# Patient Record
Sex: FEMALE | Race: BLACK OR AFRICAN AMERICAN | NOT HISPANIC OR LATINO | URBAN - METROPOLITAN AREA
[De-identification: names, ages, dates, MRNs, and addresses within clinical notes are randomized per-mention and may not be internally consistent; named-entity substitution may affect disease eponyms.]

---

## 2020-08-31 ENCOUNTER — TELEPHONE ENCOUNTER (OUTPATIENT)
Dept: URBAN - METROPOLITAN AREA CLINIC 92 | Facility: CLINIC | Age: 75
End: 2020-08-31

## 2020-09-16 ENCOUNTER — OFFICE VISIT (OUTPATIENT)
Dept: URBAN - METROPOLITAN AREA CLINIC 96 | Facility: CLINIC | Age: 75
End: 2020-09-16

## 2020-09-21 ENCOUNTER — TELEPHONE ENCOUNTER (OUTPATIENT)
Dept: URBAN - METROPOLITAN AREA CLINIC 96 | Facility: CLINIC | Age: 75
End: 2020-09-21

## 2020-10-08 ENCOUNTER — WEB ENCOUNTER (OUTPATIENT)
Dept: URBAN - METROPOLITAN AREA CLINIC 96 | Facility: CLINIC | Age: 75
End: 2020-10-08

## 2020-10-08 ENCOUNTER — OFFICE VISIT (OUTPATIENT)
Dept: URBAN - METROPOLITAN AREA CLINIC 96 | Facility: CLINIC | Age: 75
End: 2020-10-08
Payer: MEDICARE

## 2020-10-08 DIAGNOSIS — K57.32 DIVERTICULITIS LARGE INTESTINE W/O PERFORATION OR ABSCESS W/O BLEEDING: ICD-10-CM

## 2020-10-08 PROCEDURE — G8482 FLU IMMUNIZE ORDER/ADMIN: HCPCS | Performed by: INTERNAL MEDICINE

## 2020-10-08 PROCEDURE — 99213 OFFICE O/P EST LOW 20 MIN: CPT | Performed by: INTERNAL MEDICINE

## 2020-10-08 PROCEDURE — 3017F COLORECTAL CA SCREEN DOC REV: CPT | Performed by: INTERNAL MEDICINE

## 2020-10-08 PROCEDURE — G8427 DOCREV CUR MEDS BY ELIG CLIN: HCPCS | Performed by: INTERNAL MEDICINE

## 2020-10-08 PROCEDURE — G8417 CALC BMI ABV UP PARAM F/U: HCPCS | Performed by: INTERNAL MEDICINE

## 2020-10-08 PROCEDURE — 1036F TOBACCO NON-USER: CPT | Performed by: INTERNAL MEDICINE

## 2020-10-08 RX ORDER — METFORMIN HYDROCHLORIDE 500 MG/1
TABLET, COATED ORAL
Qty: 0 | Refills: 0 | Status: ACTIVE | COMMUNITY
Start: 1900-01-01 | End: 1900-01-01

## 2020-10-08 RX ORDER — FUROSEMIDE 40 MG/1
TABLET ORAL
Qty: 0 | Refills: 0 | Status: ACTIVE | COMMUNITY
Start: 1900-01-01 | End: 1900-01-01

## 2020-10-08 RX ORDER — GABAPENTIN 300 MG/1
CAPSULE ORAL
Qty: 0 | Refills: 0 | Status: ACTIVE | COMMUNITY
Start: 1900-01-01 | End: 1900-01-01

## 2020-10-08 RX ORDER — FELODIPINE EXTENDED-RELEASE TABLETS 10 MG/1
TAKE 1 TABLET (10 MG) BY ORAL ROUTE ONCE DAILY TABLET, EXTENDED RELEASE ORAL 1
Qty: 0 | Refills: 0 | Status: ACTIVE | COMMUNITY
Start: 1900-01-01 | End: 1900-01-01

## 2020-10-08 RX ORDER — ANASTROZOLE 1 MG/1
TAKE 1 TABLET (1 MG) BY ORAL ROUTE ONCE DAILY TABLET ORAL 1
Qty: 0 | Refills: 0 | Status: ACTIVE | COMMUNITY
Start: 1900-01-01 | End: 1900-01-01

## 2020-10-08 RX ORDER — SPIRONOLACTONE 25 MG/1
TABLET ORAL
Qty: 0 | Refills: 0 | Status: ACTIVE | COMMUNITY
Start: 1900-01-01 | End: 1900-01-01

## 2020-10-08 RX ORDER — RIVAROXABAN 15 MG/1
TABLET, FILM COATED ORAL
Qty: 0 | Refills: 0 | Status: ACTIVE | COMMUNITY
Start: 1900-01-01 | End: 1900-01-01

## 2020-10-08 RX ORDER — ATENOLOL 50 MG/1
TABLET ORAL
Qty: 0 | Refills: 0 | Status: ACTIVE | COMMUNITY
Start: 1900-01-01 | End: 1900-01-01

## 2020-10-08 RX ORDER — SIMVASTATIN 20 MG/1
TABLET, FILM COATED ORAL
Qty: 0 | Refills: 0 | Status: ACTIVE | COMMUNITY
Start: 1900-01-01 | End: 1900-01-01

## 2020-10-08 NOTE — HPI-TODAY'S VISIT:
had an episode of diverticulitis on 9/13. was given abx for this at [facility] (scans and reports from [facility] reviewed) states now she has run out of abx and is not 100%. when asked how much better she is she states 80% has no n/v/d/ does have some constiaption, took some magcitrate which helped went back to [facility] ER for chest pain after diverticulitis episode had CT negative for PE had stress test now wants to knwo if she needs more abx normally sees dr. dhillon, had a colonoscopy within last five years

## 2020-10-14 ENCOUNTER — OFFICE VISIT (OUTPATIENT)
Dept: URBAN - METROPOLITAN AREA CLINIC 96 | Facility: CLINIC | Age: 75
End: 2020-10-14

## 2020-10-15 ENCOUNTER — TELEPHONE ENCOUNTER (OUTPATIENT)
Dept: URBAN - METROPOLITAN AREA CLINIC 92 | Facility: CLINIC | Age: 75
End: 2020-10-15

## 2020-10-15 ENCOUNTER — OFFICE VISIT (OUTPATIENT)
Dept: URBAN - METROPOLITAN AREA CLINIC 96 | Facility: CLINIC | Age: 75
End: 2020-10-15
Payer: MEDICARE

## 2020-10-15 ENCOUNTER — LAB OUTSIDE AN ENCOUNTER (OUTPATIENT)
Dept: URBAN - METROPOLITAN AREA CLINIC 96 | Facility: CLINIC | Age: 75
End: 2020-10-15

## 2020-10-15 DIAGNOSIS — Z85.3 HISTORY OF BREAST CANCER: ICD-10-CM

## 2020-10-15 DIAGNOSIS — K57.90 DIVERTICULOSIS: ICD-10-CM

## 2020-10-15 DIAGNOSIS — Z86.010 HISTORY OF ADENOMATOUS POLYP OF COLON: ICD-10-CM

## 2020-10-15 DIAGNOSIS — K76.0 FATTY LIVER: ICD-10-CM

## 2020-10-15 DIAGNOSIS — Z87.19 HISTORY OF DIVERTICULITIS: ICD-10-CM

## 2020-10-15 DIAGNOSIS — Z86.711 HISTORY OF PULMONARY EMBOLISM: ICD-10-CM

## 2020-10-15 DIAGNOSIS — A04.72 HISTORY OF CLOSTRIDIUM DIFFICILE: ICD-10-CM

## 2020-10-15 LAB
ABSOLUTE BASOPHIL COUNT: 0.01
ABSOLUTE EOSINOPHIL COUNT: 0.17
ABSOLUTE IMMATURE GRANULOCYTE  COUNT: 0.02
ABSOLUTE LYMPHOCYTE COUNT: 1.58
ABSOLUTE MONOCYTE COUNT: 0.43
ABSOLUTE NEUTROPHIL COUNT (ANC): 3.37
ABSOLUTE NRBC  COUNT: 0
BASOPHIL AUTO: 0
CRP: 0.26
EOS AUTO: 3
ESR WESTERGREN: 37
HCT: 39.4
HGB: 12
IMMATURE GRANULOCYTES AUTO: 0.4
LYMPH AUTO: 28
MCH: 24.9
MCHC: 30.5
MCV: 81.7
MONO AUTO: 8
MPV: 10.8
NEUTRO AUTO: 60
NRBC AUTO: 0
PERFORMING LAB: (no result)
PLATELETS: 250
RBC: 4.82
RDW: 15.5
WBC: 5.6

## 2020-10-15 PROCEDURE — G9903 PT SCRN TBCO ID AS NON USER: HCPCS | Performed by: INTERNAL MEDICINE

## 2020-10-15 PROCEDURE — G8427 DOCREV CUR MEDS BY ELIG CLIN: HCPCS | Performed by: INTERNAL MEDICINE

## 2020-10-15 PROCEDURE — 3017F COLORECTAL CA SCREEN DOC REV: CPT | Performed by: INTERNAL MEDICINE

## 2020-10-15 PROCEDURE — 99214 OFFICE O/P EST MOD 30 MIN: CPT | Performed by: INTERNAL MEDICINE

## 2020-10-15 PROCEDURE — G8484 FLU IMMUNIZE NO ADMIN: HCPCS | Performed by: INTERNAL MEDICINE

## 2020-10-15 PROCEDURE — G8938 BMI DOC ONL FUP NT DOC: HCPCS | Performed by: INTERNAL MEDICINE

## 2020-10-15 PROCEDURE — 1036F TOBACCO NON-USER: CPT | Performed by: INTERNAL MEDICINE

## 2020-10-15 RX ORDER — SPIRONOLACTONE 25 MG/1
TABLET ORAL
Qty: 0 | Refills: 0 | Status: ACTIVE | COMMUNITY
Start: 1900-01-01

## 2020-10-15 RX ORDER — FUROSEMIDE 40 MG/1
TABLET ORAL
Qty: 0 | Refills: 0 | Status: ACTIVE | COMMUNITY
Start: 1900-01-01

## 2020-10-15 RX ORDER — RIVAROXABAN 15 MG/1
TABLET, FILM COATED ORAL
Qty: 0 | Refills: 0 | Status: ACTIVE | COMMUNITY
Start: 1900-01-01

## 2020-10-15 RX ORDER — FELODIPINE EXTENDED-RELEASE TABLETS 10 MG/1
TAKE 1 TABLET (10 MG) BY ORAL ROUTE ONCE DAILY TABLET, EXTENDED RELEASE ORAL 1
Qty: 0 | Refills: 0 | Status: ACTIVE | COMMUNITY
Start: 1900-01-01

## 2020-10-15 RX ORDER — ATENOLOL 50 MG/1
TABLET ORAL
Qty: 0 | Refills: 0 | Status: DISCONTINUED | COMMUNITY
Start: 1900-01-01

## 2020-10-15 RX ORDER — BALSALAZIDE DISODIUM 750 MG/1
2 CAPSULES CAPSULE ORAL TWICE A DAY
Qty: 120 CAPSULE | Refills: 0 | OUTPATIENT
Start: 2020-10-15 | End: 2020-11-13

## 2020-10-15 RX ORDER — METFORMIN HYDROCHLORIDE 500 MG/1
TABLET, COATED ORAL
Qty: 0 | Refills: 0 | Status: ACTIVE | COMMUNITY
Start: 1900-01-01

## 2020-10-15 RX ORDER — ANASTROZOLE 1 MG/1
TAKE 1 TABLET (1 MG) BY ORAL ROUTE ONCE DAILY TABLET ORAL 1
Qty: 0 | Refills: 0 | Status: DISCONTINUED | COMMUNITY
Start: 1900-01-01

## 2020-10-15 RX ORDER — GABAPENTIN 300 MG/1
CAPSULE ORAL
Qty: 0 | Refills: 0 | Status: ACTIVE | COMMUNITY
Start: 1900-01-01

## 2020-10-15 RX ORDER — SIMVASTATIN 20 MG/1
TABLET, FILM COATED ORAL
Qty: 0 | Refills: 0 | Status: ACTIVE | COMMUNITY
Start: 1900-01-01

## 2020-10-15 NOTE — HPI-TODAY'S VISIT:
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 all local who keep an eye on her seen by dr licona  10/8/2020 after recent dx of diverticulitis 9/13/2020 had not returned to baseline rxed w IBgard and told to follow low residue diet and take a probiotic no abxs felt necessary today bms still not nl. gen has a nl bm qam now, sm vol bm qod-q 2 days. feels the IBgard helpful and wants ,more samples.

## 2020-11-12 ENCOUNTER — TELEPHONE ENCOUNTER (OUTPATIENT)
Dept: URBAN - METROPOLITAN AREA CLINIC 96 | Facility: CLINIC | Age: 75
End: 2020-11-12

## 2020-12-06 ENCOUNTER — ERX REFILL RESPONSE (OUTPATIENT)
Dept: URBAN - METROPOLITAN AREA CLINIC 92 | Facility: CLINIC | Age: 75
End: 2020-12-06

## 2020-12-06 RX ORDER — BALSALAZIDE DISODIUM 750 MG/1
3 CAPSULES CAPSULE ORAL TID
Qty: 810 CAPSULE | Refills: 0

## 2021-01-12 ENCOUNTER — OFFICE VISIT (OUTPATIENT)
Dept: URBAN - METROPOLITAN AREA CLINIC 96 | Facility: CLINIC | Age: 76
End: 2021-01-12

## 2021-01-13 ENCOUNTER — OFFICE VISIT (OUTPATIENT)
Dept: URBAN - METROPOLITAN AREA CLINIC 96 | Facility: CLINIC | Age: 76
End: 2021-01-13
Payer: MEDICARE

## 2021-01-13 DIAGNOSIS — K76.0 FATTY LIVER: ICD-10-CM

## 2021-01-13 DIAGNOSIS — K57.90 DIVERTICULOSIS: ICD-10-CM

## 2021-01-13 DIAGNOSIS — Z86.010 HISTORY OF ADENOMATOUS POLYP OF COLON: ICD-10-CM

## 2021-01-13 DIAGNOSIS — Z87.19 HISTORY OF DIVERTICULITIS: ICD-10-CM

## 2021-01-13 PROBLEM — 118361000119100 HISTORY OF DIVERTICULITIS: Status: ACTIVE | Noted: 2020-10-15

## 2021-01-13 PROBLEM — 240357000 CLOSTRIDIAL ENTERIC DISEASE: Status: ACTIVE | Noted: 2020-10-15

## 2021-01-13 PROBLEM — 161512007 HISTORY OF PULMONARY EMBOLUS: Status: ACTIVE | Noted: 2020-10-15

## 2021-01-13 PROBLEM — 275551007 HISTORY OF GASTROINTESTINAL BLEED: Status: ACTIVE | Noted: 2020-10-15

## 2021-01-13 PROBLEM — 415076002 PERSONAL HISTORY OF PRIMARY MALIGNANT NEOPLASM OF BREAST: Status: ACTIVE | Noted: 2020-10-15

## 2021-01-13 PROBLEM — 429047008 HISTORY OF ADENOMATOUS POLYP OF COLON: Status: ACTIVE | Noted: 2020-10-15

## 2021-01-13 PROBLEM — 398050005 DIVERTICULAR DISEASE OF COLON: Status: ACTIVE | Noted: 2020-10-15

## 2021-01-13 PROBLEM — 197321007 FATTY LIVER: Status: ACTIVE | Noted: 2020-10-15

## 2021-01-13 PROCEDURE — G9903 PT SCRN TBCO ID AS NON USER: HCPCS | Performed by: INTERNAL MEDICINE

## 2021-01-13 PROCEDURE — G8484 FLU IMMUNIZE NO ADMIN: HCPCS | Performed by: INTERNAL MEDICINE

## 2021-01-13 PROCEDURE — 3017F COLORECTAL CA SCREEN DOC REV: CPT | Performed by: INTERNAL MEDICINE

## 2021-01-13 PROCEDURE — 1036F TOBACCO NON-USER: CPT | Performed by: INTERNAL MEDICINE

## 2021-01-13 PROCEDURE — G8938 BMI DOC ONL FUP NT DOC: HCPCS | Performed by: INTERNAL MEDICINE

## 2021-01-13 PROCEDURE — 99214 OFFICE O/P EST MOD 30 MIN: CPT | Performed by: INTERNAL MEDICINE

## 2021-01-13 PROCEDURE — G8427 DOCREV CUR MEDS BY ELIG CLIN: HCPCS | Performed by: INTERNAL MEDICINE

## 2021-01-13 RX ORDER — METFORMIN HYDROCHLORIDE 500 MG/1
TABLET, COATED ORAL
Qty: 0 | Refills: 0 | Status: ACTIVE | COMMUNITY
Start: 1900-01-01

## 2021-01-13 RX ORDER — BALSALAZIDE DISODIUM 750 MG/1
3 CAPSULES CAPSULE ORAL TID
Qty: 810 CAPSULE | Refills: 0 | Status: ACTIVE | COMMUNITY

## 2021-01-13 RX ORDER — FUROSEMIDE 40 MG/1
TABLET ORAL
Qty: 0 | Refills: 0 | Status: ACTIVE | COMMUNITY
Start: 1900-01-01

## 2021-01-13 RX ORDER — GABAPENTIN 300 MG/1
CAPSULE ORAL
Qty: 0 | Refills: 0 | Status: ACTIVE | COMMUNITY
Start: 1900-01-01

## 2021-01-13 RX ORDER — RIVAROXABAN 15 MG/1
TABLET, FILM COATED ORAL
Qty: 0 | Refills: 0 | Status: ACTIVE | COMMUNITY
Start: 1900-01-01

## 2021-01-13 RX ORDER — SIMVASTATIN 20 MG/1
TABLET, FILM COATED ORAL
Qty: 0 | Refills: 0 | Status: ACTIVE | COMMUNITY
Start: 1900-01-01

## 2021-01-13 RX ORDER — SPIRONOLACTONE 25 MG/1
TABLET ORAL
Qty: 0 | Refills: 0 | Status: ACTIVE | COMMUNITY
Start: 1900-01-01

## 2021-01-13 RX ORDER — FELODIPINE EXTENDED-RELEASE TABLETS 10 MG/1
TAKE 1 TABLET (10 MG) BY ORAL ROUTE ONCE DAILY TABLET, EXTENDED RELEASE ORAL 1
Qty: 0 | Refills: 0 | Status: ACTIVE | COMMUNITY
Start: 1900-01-01

## 2021-01-13 NOTE — HPI-TODAY'S VISIT:
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 all local who keep an eye on her last ov 10/15/2020 after recent dx of diverticulitis 9/13/2020 had not returned to baseline. seen by dr monae Valencia and told to follow was following a low residue diet and taking a probiotic no abxs felt necessary then today. finally better. finished her IBgard samples and just taking morales colon health having a bm am and maybe one later in day. nl. occ discomfort on L side

## 2022-02-07 ENCOUNTER — OFFICE VISIT (OUTPATIENT)
Dept: URBAN - METROPOLITAN AREA CLINIC 96 | Facility: CLINIC | Age: 77
End: 2022-02-07

## 2022-02-07 RX ORDER — FELODIPINE EXTENDED-RELEASE TABLETS 10 MG/1
TAKE 1 TABLET (10 MG) BY ORAL ROUTE ONCE DAILY TABLET, EXTENDED RELEASE ORAL 1
Qty: 0 | Refills: 0 | Status: ACTIVE | COMMUNITY
Start: 1900-01-01

## 2022-02-07 RX ORDER — SIMVASTATIN 20 MG/1
TABLET, FILM COATED ORAL
Qty: 0 | Refills: 0 | Status: ACTIVE | COMMUNITY
Start: 1900-01-01

## 2022-02-07 RX ORDER — BALSALAZIDE DISODIUM 750 MG/1
3 CAPSULES CAPSULE ORAL TID
Qty: 810 CAPSULE | Refills: 0 | Status: ACTIVE | COMMUNITY

## 2022-02-07 RX ORDER — SPIRONOLACTONE 25 MG/1
TABLET ORAL
Qty: 0 | Refills: 0 | Status: ACTIVE | COMMUNITY
Start: 1900-01-01

## 2022-02-07 RX ORDER — METFORMIN HYDROCHLORIDE 500 MG/1
TABLET, COATED ORAL
Qty: 0 | Refills: 0 | Status: ACTIVE | COMMUNITY
Start: 1900-01-01

## 2022-02-07 RX ORDER — FUROSEMIDE 40 MG/1
TABLET ORAL
Qty: 0 | Refills: 0 | Status: ACTIVE | COMMUNITY
Start: 1900-01-01

## 2022-02-07 RX ORDER — GABAPENTIN 300 MG/1
CAPSULE ORAL
Qty: 0 | Refills: 0 | Status: ACTIVE | COMMUNITY
Start: 1900-01-01

## 2022-02-07 RX ORDER — RIVAROXABAN 15 MG/1
TABLET, FILM COATED ORAL
Qty: 0 | Refills: 0 | Status: ACTIVE | COMMUNITY
Start: 1900-01-01

## 2023-04-17 ENCOUNTER — OFFICE VISIT (OUTPATIENT)
Dept: URBAN - METROPOLITAN AREA CLINIC 96 | Facility: CLINIC | Age: 78
End: 2023-04-17

## 2023-09-19 ENCOUNTER — OUT OF OFFICE VISIT (OUTPATIENT)
Dept: URBAN - METROPOLITAN AREA MEDICAL CENTER 28 | Facility: MEDICAL CENTER | Age: 78
End: 2023-09-19
Payer: MEDICARE

## 2023-09-19 DIAGNOSIS — Z79.01 ADEQUATE ANTICOAGULATION ON ANTICOAGULANT THERAPY: ICD-10-CM

## 2023-09-19 DIAGNOSIS — E87.6 ACUTE HYPOKALEMIA: ICD-10-CM

## 2023-09-19 DIAGNOSIS — R71.0 DECREASED HEMOGLOBIN: ICD-10-CM

## 2023-09-19 DIAGNOSIS — K62.5 ANAL BLEEDING: ICD-10-CM

## 2023-09-19 PROCEDURE — 99214 OFFICE O/P EST MOD 30 MIN: CPT | Performed by: PHYSICIAN ASSISTANT

## 2023-09-20 ENCOUNTER — OUT OF OFFICE VISIT (OUTPATIENT)
Dept: URBAN - METROPOLITAN AREA MEDICAL CENTER 28 | Facility: MEDICAL CENTER | Age: 78
End: 2023-09-20
Payer: MEDICARE

## 2023-09-20 DIAGNOSIS — K92.1 ACUTE MELENA: ICD-10-CM

## 2023-09-20 PROCEDURE — 45378 DIAGNOSTIC COLONOSCOPY: CPT | Performed by: INTERNAL MEDICINE

## 2023-09-21 ENCOUNTER — OUT OF OFFICE VISIT (OUTPATIENT)
Dept: URBAN - METROPOLITAN AREA MEDICAL CENTER 28 | Facility: MEDICAL CENTER | Age: 78
End: 2023-09-21
Payer: MEDICARE

## 2023-09-21 DIAGNOSIS — K62.5 ANAL BLEEDING: ICD-10-CM

## 2023-09-21 DIAGNOSIS — Z79.01 ADEQUATE ANTICOAGULATION ON ANTICOAGULANT THERAPY: ICD-10-CM

## 2023-09-21 PROCEDURE — 99214 OFFICE O/P EST MOD 30 MIN: CPT | Performed by: PHYSICIAN ASSISTANT

## 2023-11-21 ENCOUNTER — OFFICE VISIT (OUTPATIENT)
Dept: URBAN - METROPOLITAN AREA CLINIC 96 | Facility: CLINIC | Age: 78
End: 2023-11-21

## 2023-12-01 ENCOUNTER — TELEPHONE ENCOUNTER (OUTPATIENT)
Dept: URBAN - METROPOLITAN AREA CLINIC 96 | Facility: CLINIC | Age: 78
End: 2023-12-01

## 2023-12-06 ENCOUNTER — DASHBOARD ENCOUNTERS (OUTPATIENT)
Age: 78
End: 2023-12-06

## 2023-12-06 ENCOUNTER — OFFICE VISIT (OUTPATIENT)
Dept: URBAN - METROPOLITAN AREA CLINIC 96 | Facility: CLINIC | Age: 78
End: 2023-12-06
Payer: MEDICARE

## 2023-12-06 VITALS
BODY MASS INDEX: 38.7 KG/M2 | DIASTOLIC BLOOD PRESSURE: 81 MMHG | HEART RATE: 78 BPM | WEIGHT: 218.4 LBS | HEIGHT: 63 IN | SYSTOLIC BLOOD PRESSURE: 114 MMHG | TEMPERATURE: 97.9 F

## 2023-12-06 DIAGNOSIS — K57.32 DIVERTICULITIS LARGE INTESTINE: ICD-10-CM

## 2023-12-06 PROCEDURE — 99213 OFFICE O/P EST LOW 20 MIN: CPT | Performed by: INTERNAL MEDICINE

## 2023-12-06 RX ORDER — METFORMIN HYDROCHLORIDE 500 MG/1
TABLET, COATED ORAL
Qty: 0 | Refills: 0 | Status: ACTIVE | COMMUNITY
Start: 1900-01-01

## 2023-12-06 RX ORDER — FELODIPINE EXTENDED-RELEASE TABLETS 10 MG/1
TAKE 1 TABLET (10 MG) BY ORAL ROUTE ONCE DAILY TABLET, EXTENDED RELEASE ORAL 1
Qty: 0 | Refills: 0 | Status: ACTIVE | COMMUNITY
Start: 1900-01-01

## 2023-12-06 RX ORDER — SPIRONOLACTONE 25 MG/1
TABLET ORAL
Qty: 0 | Refills: 0 | Status: ACTIVE | COMMUNITY
Start: 1900-01-01

## 2023-12-06 RX ORDER — METRONIDAZOLE 500 MG/1
1 TABLET TABLET, FILM COATED ORAL
Qty: 28 TABLET | Refills: 0 | OUTPATIENT
Start: 2023-12-06 | End: 2023-12-20

## 2023-12-06 RX ORDER — RIVAROXABAN 15 MG/1
TABLET, FILM COATED ORAL
Qty: 0 | Refills: 0 | Status: ACTIVE | COMMUNITY
Start: 1900-01-01

## 2023-12-06 RX ORDER — GABAPENTIN 300 MG/1
CAPSULE ORAL
Qty: 0 | Refills: 0 | Status: ACTIVE | COMMUNITY
Start: 1900-01-01

## 2023-12-06 RX ORDER — FUROSEMIDE 40 MG/1
TABLET ORAL
Qty: 0 | Refills: 0 | Status: ACTIVE | COMMUNITY
Start: 1900-01-01

## 2023-12-06 RX ORDER — SIMVASTATIN 20 MG/1
TABLET, FILM COATED ORAL
Qty: 0 | Refills: 0 | Status: ACTIVE | COMMUNITY
Start: 1900-01-01

## 2023-12-06 RX ORDER — BALSALAZIDE DISODIUM 750 MG/1
3 CAPSULES CAPSULE ORAL TID
Qty: 810 CAPSULE | Refills: 0 | Status: ACTIVE | COMMUNITY

## 2023-12-06 NOTE — HPI-TODAY'S VISIT:
This is a 78-year-old female with history of coronary artery disease s/p stent, diabetes, and history of pulmonary embolism on Xarelto who was admitted overnight with rectal bleeding and then diverticulitis. . Had attack of LLQ, presumed diverticulitis.  Sxs occurred after eating peanuts.  Does not each much fiber.  Initially had some constipation.  Drinks plenty of water.. No n/v/f.

## 2024-08-07 ENCOUNTER — CLAIMS CREATED FROM THE CLAIM WINDOW (OUTPATIENT)
Dept: URBAN - METROPOLITAN AREA MEDICAL CENTER 28 | Facility: MEDICAL CENTER | Age: 79
End: 2024-08-07
Payer: MEDICARE

## 2024-08-07 DIAGNOSIS — K92.1 ACUTE MELENA: ICD-10-CM

## 2024-08-07 DIAGNOSIS — D64.89 ANEMIA DUE TO OTHER CAUSE: ICD-10-CM

## 2024-08-07 DIAGNOSIS — R74.8 ABNORMAL ALKALINE PHOSPHATASE TEST: ICD-10-CM

## 2024-08-07 PROCEDURE — G8427 DOCREV CUR MEDS BY ELIG CLIN: HCPCS

## 2024-08-07 PROCEDURE — 99222 1ST HOSP IP/OBS MODERATE 55: CPT

## 2024-08-07 PROCEDURE — 99254 IP/OBS CNSLTJ NEW/EST MOD 60: CPT

## 2024-08-08 ENCOUNTER — CLAIMS CREATED FROM THE CLAIM WINDOW (OUTPATIENT)
Dept: URBAN - METROPOLITAN AREA MEDICAL CENTER 28 | Facility: MEDICAL CENTER | Age: 79
End: 2024-08-08
Payer: MEDICARE

## 2024-08-08 DIAGNOSIS — K76.89 ABNORMAL FINDING ON LIVER FUNCTION: ICD-10-CM

## 2024-08-08 DIAGNOSIS — D64.89 ANEMIA DUE TO OTHER CAUSE: ICD-10-CM

## 2024-08-08 DIAGNOSIS — K92.1 ACUTE MELENA: ICD-10-CM

## 2024-08-08 DIAGNOSIS — R74.01 ABNORMAL/ELEVATED TRANSAMINASE (SGOT, AMINOTRANSFERASE): ICD-10-CM

## 2024-08-08 PROCEDURE — 99232 SBSQ HOSP IP/OBS MODERATE 35: CPT

## 2024-08-09 ENCOUNTER — CLAIMS CREATED FROM THE CLAIM WINDOW (OUTPATIENT)
Dept: URBAN - METROPOLITAN AREA MEDICAL CENTER 28 | Facility: MEDICAL CENTER | Age: 79
End: 2024-08-09
Payer: MEDICARE

## 2024-08-09 DIAGNOSIS — K92.1 ACUTE MELENA: ICD-10-CM

## 2024-08-09 PROCEDURE — 45380 COLONOSCOPY AND BIOPSY: CPT | Performed by: INTERNAL MEDICINE

## 2024-08-09 PROCEDURE — 45378 DIAGNOSTIC COLONOSCOPY: CPT | Performed by: INTERNAL MEDICINE

## 2024-08-21 ENCOUNTER — OFFICE VISIT (OUTPATIENT)
Dept: URBAN - METROPOLITAN AREA CLINIC 96 | Facility: CLINIC | Age: 79
End: 2024-08-21
Payer: MEDICARE

## 2024-08-21 VITALS
HEART RATE: 68 BPM | DIASTOLIC BLOOD PRESSURE: 69 MMHG | HEIGHT: 63 IN | BODY MASS INDEX: 37.21 KG/M2 | SYSTOLIC BLOOD PRESSURE: 119 MMHG | TEMPERATURE: 98 F | WEIGHT: 210 LBS

## 2024-08-21 DIAGNOSIS — Z86.010 HISTORY OF ADENOMATOUS POLYP OF COLON: ICD-10-CM

## 2024-08-21 DIAGNOSIS — Z86.2 HISTORY OF IRON DEFICIENCY ANEMIA: ICD-10-CM

## 2024-08-21 PROCEDURE — 99214 OFFICE O/P EST MOD 30 MIN: CPT | Performed by: INTERNAL MEDICINE

## 2024-08-21 RX ORDER — RIVAROXABAN 10 MG/1
1 TABLET TABLET, FILM COATED ORAL ONCE A DAY
Refills: 0 | Status: ACTIVE | COMMUNITY
Start: 1900-01-01

## 2024-08-21 RX ORDER — FERROUS SULFATE 325(65) MG
1 TABLET TABLET ORAL THREE TIMES A DAY
Status: ACTIVE | COMMUNITY

## 2024-08-21 RX ORDER — ATORVASTATIN CALCIUM 80 MG/1
1 TABLET TABLET, FILM COATED ORAL ONCE A DAY
Status: ACTIVE | COMMUNITY

## 2024-08-21 RX ORDER — FUROSEMIDE 40 MG/1
1 TABLET TABLET ORAL ONCE A DAY
Refills: 0 | Status: ACTIVE | COMMUNITY
Start: 1900-01-01

## 2024-08-21 RX ORDER — CARVEDILOL 6.25 MG/1
1 TABLET WITH FOOD TABLET, FILM COATED ORAL TWICE A DAY
Status: ACTIVE | COMMUNITY

## 2024-08-21 RX ORDER — METFORMIN HYDROCHLORIDE 500 MG/1
1 TABLET WITH A MEAL TABLET, COATED ORAL TWICE A DAY
Refills: 0 | Status: ACTIVE | COMMUNITY
Start: 1900-01-01

## 2024-08-21 RX ORDER — GABAPENTIN 300 MG/1
CAPSULE ORAL
Qty: 0 | Refills: 0 | Status: ACTIVE | COMMUNITY
Start: 1900-01-01

## 2024-08-21 RX ORDER — SODIUM BICARBONATE TAB 650 MG 650 MG
AS DIRECTED TAB ORAL DAILY
Status: ACTIVE | COMMUNITY

## 2024-08-21 NOTE — HPI-TODAY'S VISIT:
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 all local who keep an eye on her. here today w a daughter. last ov was w dr evangelista 12/6/2023. last ov w me 1/13/2021. recent admit 8/7-12/2024 for hematochezia. colon- osis. no bleeding since d/c. on po iron.  bms are 2-3xs/d. heme felt a dec dose of xarelto of 10mg a compromise w hx of GIB x2 and recurrent PE. dr ross is cardiologist dr trammell is oncologist

## 2024-08-27 PROBLEM — 161456009: Status: ACTIVE | Noted: 2024-08-21

## 2024-08-27 PROBLEM — 53741008: Status: ACTIVE | Noted: 2024-08-27

## 2024-08-27 PROBLEM — 85057007: Status: ACTIVE | Noted: 2024-08-27

## 2024-08-27 PROBLEM — 75183008: Status: ACTIVE | Noted: 2024-08-27

## 2024-09-18 ENCOUNTER — TELEPHONE ENCOUNTER (OUTPATIENT)
Dept: URBAN - METROPOLITAN AREA CLINIC 96 | Facility: CLINIC | Age: 79
End: 2024-09-18

## 2024-09-27 ENCOUNTER — TELEPHONE ENCOUNTER (OUTPATIENT)
Dept: URBAN - METROPOLITAN AREA CLINIC 96 | Facility: CLINIC | Age: 79
End: 2024-09-27

## 2024-10-15 ENCOUNTER — OFFICE VISIT (OUTPATIENT)
Dept: URBAN - METROPOLITAN AREA CLINIC 96 | Facility: CLINIC | Age: 79
End: 2024-10-15

## 2024-11-13 ENCOUNTER — CLAIMS CREATED FROM THE CLAIM WINDOW (OUTPATIENT)
Dept: URBAN - METROPOLITAN AREA MEDICAL CENTER 28 | Facility: MEDICAL CENTER | Age: 79
End: 2024-11-13
Payer: MEDICARE

## 2024-11-13 DIAGNOSIS — K92.1 ACUTE MELENA: ICD-10-CM

## 2024-11-13 DIAGNOSIS — K21.9 ACID REFLUX: ICD-10-CM

## 2024-11-13 DIAGNOSIS — K62.5 ANAL BLEEDING: ICD-10-CM

## 2024-11-13 PROCEDURE — G8427 DOCREV CUR MEDS BY ELIG CLIN: HCPCS

## 2024-11-13 PROCEDURE — 99254 IP/OBS CNSLTJ NEW/EST MOD 60: CPT

## 2024-11-13 PROCEDURE — 99222 1ST HOSP IP/OBS MODERATE 55: CPT

## 2024-11-13 PROCEDURE — 45330 DIAGNOSTIC SIGMOIDOSCOPY: CPT | Performed by: INTERNAL MEDICINE

## 2024-11-13 PROCEDURE — 45378 DIAGNOSTIC COLONOSCOPY: CPT | Performed by: INTERNAL MEDICINE

## 2024-11-14 ENCOUNTER — CLAIMS CREATED FROM THE CLAIM WINDOW (OUTPATIENT)
Dept: URBAN - METROPOLITAN AREA MEDICAL CENTER 28 | Facility: MEDICAL CENTER | Age: 79
End: 2024-11-14
Payer: MEDICARE

## 2024-11-14 DIAGNOSIS — K92.1 ACUTE MELENA: ICD-10-CM

## 2024-11-14 PROCEDURE — 45378 DIAGNOSTIC COLONOSCOPY: CPT | Performed by: INTERNAL MEDICINE

## 2024-11-14 PROCEDURE — 45380 COLONOSCOPY AND BIOPSY: CPT | Performed by: INTERNAL MEDICINE

## 2024-11-15 ENCOUNTER — CLAIMS CREATED FROM THE CLAIM WINDOW (OUTPATIENT)
Dept: URBAN - METROPOLITAN AREA MEDICAL CENTER 28 | Facility: MEDICAL CENTER | Age: 79
End: 2024-11-15
Payer: MEDICARE

## 2024-11-15 DIAGNOSIS — Z79.01 ADEQUATE ANTICOAGULATION ON ANTICOAGULANT THERAPY: ICD-10-CM

## 2024-11-15 DIAGNOSIS — K62.5 ANAL BLEEDING: ICD-10-CM

## 2024-11-15 DIAGNOSIS — K21.9 ACID REFLUX: ICD-10-CM

## 2024-11-15 PROCEDURE — 99232 SBSQ HOSP IP/OBS MODERATE 35: CPT

## 2024-11-16 ENCOUNTER — CLAIMS CREATED FROM THE CLAIM WINDOW (OUTPATIENT)
Dept: URBAN - METROPOLITAN AREA MEDICAL CENTER 28 | Facility: MEDICAL CENTER | Age: 79
End: 2024-11-16
Payer: MEDICARE

## 2024-11-16 DIAGNOSIS — K62.5 ANAL BLEEDING: ICD-10-CM

## 2024-11-16 PROCEDURE — 99232 SBSQ HOSP IP/OBS MODERATE 35: CPT | Performed by: INTERNAL MEDICINE

## 2024-11-20 ENCOUNTER — OFFICE VISIT (OUTPATIENT)
Dept: URBAN - METROPOLITAN AREA CLINIC 96 | Facility: CLINIC | Age: 79
End: 2024-11-20
Payer: MEDICARE

## 2024-11-20 VITALS
WEIGHT: 220 LBS | DIASTOLIC BLOOD PRESSURE: 66 MMHG | HEIGHT: 63 IN | SYSTOLIC BLOOD PRESSURE: 92 MMHG | RESPIRATION RATE: 18 BRPM | HEART RATE: 59 BPM | TEMPERATURE: 98.1 F | BODY MASS INDEX: 38.98 KG/M2

## 2024-11-20 DIAGNOSIS — A04.72 HISTORY OF CLOSTRIDIUM DIFFICILE: ICD-10-CM

## 2024-11-20 DIAGNOSIS — Z86.0101 PERSONAL HISTORY OF ADENOMATOUS AND SERRATED COLON POLYPS: ICD-10-CM

## 2024-11-20 DIAGNOSIS — Z87.19 HISTORY OF GI BLEED: ICD-10-CM

## 2024-11-20 DIAGNOSIS — Z86.2 HISTORY OF IRON DEFICIENCY ANEMIA: ICD-10-CM

## 2024-11-20 DIAGNOSIS — K57.90 DIVERTICULOSIS: ICD-10-CM

## 2024-11-20 DIAGNOSIS — Z85.3 HISTORY OF BREAST CANCER: ICD-10-CM

## 2024-11-20 DIAGNOSIS — K76.0 FATTY LIVER: ICD-10-CM

## 2024-11-20 DIAGNOSIS — I25.10 CORONARY ARTERY DISEASE INVOLVING NATIVE CORONARY ARTERY OF NATIVE HEART WITHOUT ANGINA PECTORIS: ICD-10-CM

## 2024-11-20 DIAGNOSIS — K76.89 HEPATIC CYST: ICD-10-CM

## 2024-11-20 DIAGNOSIS — Z87.19 HISTORY OF DIVERTICULITIS: ICD-10-CM

## 2024-11-20 DIAGNOSIS — R94.5 ABNORMAL LIVER FUNCTION: ICD-10-CM

## 2024-11-20 DIAGNOSIS — Z86.711 HISTORY OF PULMONARY EMBOLISM: ICD-10-CM

## 2024-11-20 PROCEDURE — 99213 OFFICE O/P EST LOW 20 MIN: CPT

## 2024-11-20 RX ORDER — ATORVASTATIN CALCIUM 80 MG/1
1 TABLET TABLET, FILM COATED ORAL ONCE A DAY
Status: ACTIVE | COMMUNITY

## 2024-11-20 RX ORDER — FUROSEMIDE 40 MG/1
1 TABLET TABLET ORAL ONCE A DAY
Refills: 0 | Status: ACTIVE | COMMUNITY
Start: 1900-01-01

## 2024-11-20 RX ORDER — CARVEDILOL 6.25 MG/1
1 TABLET WITH FOOD TABLET, FILM COATED ORAL TWICE A DAY
Status: ACTIVE | COMMUNITY

## 2024-11-20 RX ORDER — FERROUS SULFATE 325(65) MG
1 TABLET TABLET ORAL THREE TIMES A DAY
Status: ACTIVE | COMMUNITY

## 2024-11-20 RX ORDER — GABAPENTIN 300 MG/1
CAPSULE ORAL
Qty: 0 | Refills: 0 | Status: ACTIVE | COMMUNITY
Start: 1900-01-01

## 2024-11-20 RX ORDER — RIVAROXABAN 10 MG/1
1 TABLET TABLET, FILM COATED ORAL ONCE A DAY
Refills: 0 | Status: ACTIVE | COMMUNITY
Start: 1900-01-01

## 2024-11-20 RX ORDER — SODIUM BICARBONATE TAB 650 MG 650 MG
AS DIRECTED TAB ORAL DAILY
Status: ACTIVE | COMMUNITY

## 2024-11-20 RX ORDER — METFORMIN HYDROCHLORIDE 500 MG/1
1 TABLET WITH A MEAL TABLET, COATED ORAL TWICE A DAY
Refills: 0 | Status: ACTIVE | COMMUNITY
Start: 1900-01-01

## 2024-11-20 NOTE — HPI-OTHER HISTORIES
Previously 8/2024 with Dr. 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 all local who keep an eye on her. here today w a daughter. last ov was w dr evangelista 12/6/2023. last ov w me 1/13/2021. recent admit 8/7-12/2024 for hematochezia. colon- osis. no bleeding since d/c. on po iron.  bms are 2-3xs/d. heme felt a dec dose of xarelto of 10mg a compromise w hx of GIB x2 and recurrent PE. dr ross is cardiologist dr trammell is oncologist

## 2024-11-20 NOTE — HPI-TODAY'S VISIT:
79 year old female presents for recent hospitalization for diverticuluar bleed.  Patient was admitted to [facility] for lower GI bleed.  CT angio 11/13 positive for bleeding sigmoid colon.  Flex sig on 11/13 with poor prep.  Blood in entire examined colon, diverticulosis and rectosigmoid colon and descending colon.  IR was consulted for embolization but determined patient was not appropriate for intervention at that time.  Colonoscopy on 11/14 with fair prep.  Blood in entire examined colon, diverticulosis in sigmoid and descending colon.  GI bleed believed to be secondary to diverticular bleed which resolved.  Patient was restarted on Xarelto.  Advised to take Metamucil BID . Today, patient reports she has had no further bleeding since discharge. Intermittent lower abdominal cramping.  Resolves with a BM.

## 2025-01-14 ENCOUNTER — OFFICE VISIT (OUTPATIENT)
Dept: URBAN - METROPOLITAN AREA CLINIC 96 | Facility: CLINIC | Age: 80
End: 2025-01-14
Payer: MEDICARE

## 2025-01-14 VITALS
BODY MASS INDEX: 37.92 KG/M2 | HEIGHT: 63 IN | WEIGHT: 214 LBS | SYSTOLIC BLOOD PRESSURE: 98 MMHG | TEMPERATURE: 97.7 F | DIASTOLIC BLOOD PRESSURE: 77 MMHG | RESPIRATION RATE: 18 BRPM | HEART RATE: 74 BPM

## 2025-01-14 DIAGNOSIS — K76.0 FATTY LIVER: ICD-10-CM

## 2025-01-14 DIAGNOSIS — Z86.2 HISTORY OF IRON DEFICIENCY ANEMIA: ICD-10-CM

## 2025-01-14 DIAGNOSIS — Z86.711 HISTORY OF PULMONARY EMBOLISM: ICD-10-CM

## 2025-01-14 DIAGNOSIS — Z87.19 HISTORY OF GI BLEED: ICD-10-CM

## 2025-01-14 DIAGNOSIS — R94.5 ABNORMAL LIVER FUNCTION: ICD-10-CM

## 2025-01-14 DIAGNOSIS — Z86.0100 HISTORY OF COLON POLYPS: ICD-10-CM

## 2025-01-14 DIAGNOSIS — Z85.3 HISTORY OF BREAST CANCER: ICD-10-CM

## 2025-01-14 DIAGNOSIS — K76.89 HEPATIC CYST: ICD-10-CM

## 2025-01-14 DIAGNOSIS — I25.10 CORONARY ARTERY DISEASE INVOLVING NATIVE CORONARY ARTERY OF NATIVE HEART WITHOUT ANGINA PECTORIS: ICD-10-CM

## 2025-01-14 DIAGNOSIS — K57.90 DIVERTICULOSIS: ICD-10-CM

## 2025-01-14 DIAGNOSIS — A04.72 HISTORY OF CLOSTRIDIUM DIFFICILE: ICD-10-CM

## 2025-01-14 PROCEDURE — 99214 OFFICE O/P EST MOD 30 MIN: CPT | Performed by: INTERNAL MEDICINE

## 2025-01-14 RX ORDER — CARVEDILOL 6.25 MG/1
1 TABLET WITH FOOD TABLET, FILM COATED ORAL TWICE A DAY
Status: ACTIVE | COMMUNITY

## 2025-01-14 RX ORDER — ATORVASTATIN CALCIUM 80 MG/1
1 TABLET TABLET, FILM COATED ORAL ONCE A DAY
Status: ACTIVE | COMMUNITY

## 2025-01-14 RX ORDER — FUROSEMIDE 40 MG/1
1 TABLET TABLET ORAL ONCE A DAY
Refills: 0 | Status: ACTIVE | COMMUNITY
Start: 1900-01-01

## 2025-01-14 RX ORDER — GABAPENTIN 300 MG/1
CAPSULE ORAL
Qty: 0 | Refills: 0 | Status: ACTIVE | COMMUNITY
Start: 1900-01-01

## 2025-01-14 RX ORDER — RIVAROXABAN 10 MG/1
1 TABLET TABLET, FILM COATED ORAL ONCE A DAY
Refills: 0 | Status: ACTIVE | COMMUNITY
Start: 1900-01-01

## 2025-01-14 RX ORDER — SODIUM BICARBONATE TAB 650 MG 650 MG
AS DIRECTED TAB ORAL DAILY
Status: ACTIVE | COMMUNITY

## 2025-01-14 RX ORDER — METFORMIN HYDROCHLORIDE 500 MG/1
1 TABLET WITH A MEAL TABLET, COATED ORAL TWICE A DAY
Refills: 0 | Status: ACTIVE | COMMUNITY
Start: 1900-01-01

## 2025-01-14 RX ORDER — FERROUS SULFATE 325(65) MG
1 TABLET TABLET ORAL THREE TIMES A DAY
Status: ACTIVE | COMMUNITY

## 2025-01-14 NOTE — HPI-TODAY'S VISIT:
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  lives alone. 5 kids. all local who keep an eye on her. here today w a daughter. last ov was w livan 11/20/2024 admit 8/7-12/2024 for hematochezia. colon- osis. was on po iron.  heme felt a dec dose of xarelto of 10mg a compromise w hx of GIB x2 and recurrent PE. but, repeat diverticular bleed 11/13/2024.  CT angio 11/13/2024-positive for bleeding sigmoid. IR determined not appropriate for embolization. FS 11/13/2024-full of blood. colon 11/14/2024-full of blood, sig and desc osis. restarted on xarelto. returned to baseline of 2bms/d. dr ross is cardiologist dr trammell is oncologist

## 2025-05-20 ENCOUNTER — OFFICE VISIT (OUTPATIENT)
Dept: URBAN - METROPOLITAN AREA CLINIC 96 | Facility: CLINIC | Age: 80
End: 2025-05-20

## 2025-06-05 ENCOUNTER — OFFICE VISIT (OUTPATIENT)
Dept: URBAN - METROPOLITAN AREA CLINIC 96 | Facility: CLINIC | Age: 80
End: 2025-06-05
Payer: MEDICARE

## 2025-06-05 DIAGNOSIS — Z86.711 HISTORY OF PULMONARY EMBOLISM: ICD-10-CM

## 2025-06-05 DIAGNOSIS — K57.90 DIVERTICULOSIS: ICD-10-CM

## 2025-06-05 DIAGNOSIS — K76.89 HEPATIC CYST: ICD-10-CM

## 2025-06-05 DIAGNOSIS — A04.72 HISTORY OF CLOSTRIDIUM DIFFICILE: ICD-10-CM

## 2025-06-05 DIAGNOSIS — Z86.0101 H/O ADENOMATOUS POLYP OF COLON: ICD-10-CM

## 2025-06-05 DIAGNOSIS — K76.0 FATTY LIVER: ICD-10-CM

## 2025-06-05 DIAGNOSIS — Z85.3 HISTORY OF BREAST CANCER: ICD-10-CM

## 2025-06-05 DIAGNOSIS — Z86.2 HISTORY OF IRON DEFICIENCY ANEMIA: ICD-10-CM

## 2025-06-05 DIAGNOSIS — I25.10 CORONARY ARTERY DISEASE INVOLVING NATIVE CORONARY ARTERY OF NATIVE HEART WITHOUT ANGINA PECTORIS: ICD-10-CM

## 2025-06-05 DIAGNOSIS — Z87.19 HISTORY OF GI BLEED: ICD-10-CM

## 2025-06-05 DIAGNOSIS — R94.5 ABNORMAL LIVER FUNCTION: ICD-10-CM

## 2025-06-05 PROCEDURE — 99212 OFFICE O/P EST SF 10 MIN: CPT

## 2025-06-05 RX ORDER — RIVAROXABAN 10 MG/1
1 TABLET TABLET, FILM COATED ORAL ONCE A DAY
Refills: 0 | Status: ACTIVE | COMMUNITY
Start: 1900-01-01

## 2025-06-05 RX ORDER — FUROSEMIDE 40 MG/1
1 TABLET TABLET ORAL ONCE A DAY
Refills: 0 | Status: ACTIVE | COMMUNITY
Start: 1900-01-01

## 2025-06-05 RX ORDER — ATORVASTATIN CALCIUM 80 MG/1
1 TABLET TABLET, FILM COATED ORAL ONCE A DAY
Status: ACTIVE | COMMUNITY

## 2025-06-05 RX ORDER — CARVEDILOL 6.25 MG/1
1 TABLET WITH FOOD TABLET, FILM COATED ORAL TWICE A DAY
Status: ACTIVE | COMMUNITY

## 2025-06-05 RX ORDER — METFORMIN HYDROCHLORIDE 500 MG/1
1 TABLET WITH A MEAL TABLET, COATED ORAL TWICE A DAY
Refills: 0 | Status: ACTIVE | COMMUNITY
Start: 1900-01-01

## 2025-06-05 RX ORDER — FERROUS SULFATE 325(65) MG
1 TABLET TABLET ORAL THREE TIMES A DAY
Status: ACTIVE | COMMUNITY

## 2025-06-05 RX ORDER — GABAPENTIN 300 MG/1
CAPSULE ORAL
Qty: 0 | Refills: 0 | Status: ACTIVE | COMMUNITY
Start: 1900-01-01

## 2025-06-05 RX ORDER — SODIUM BICARBONATE TAB 650 MG 650 MG
AS DIRECTED TAB ORAL DAILY
Status: ACTIVE | COMMUNITY

## 2025-06-05 NOTE — HPI-OTHER HISTORIES
Previously 1/2025 with Dr. 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 all local who keep an eye on her. here today w a daughter. last ov was w livan 11/20/2024 admit 8/7-12/2024 for hematochezia. colon- osis. was on po iron.  heme felt a dec dose of xarelto of 10mg a compromise w hx of GIB x2 and recurrent PE. but, repeat diverticular bleed 11/13/2024.  CT angio 11/13/2024-positive for bleeding sigmoid. IR determined not appropriate for embolization. FS 11/13/2024-full of blood. colon 11/14/2024-full of blood, sig and desc osis. restarted on xarelto. returned to baseline of 2bms/d. dr ross is cardiologist dr trammell is oncologist

## 2025-06-05 NOTE — HPI-TODAY'S VISIT:
80 year old female presents for follow-up. Feeling pretty good. Denies dysphagia, odynophagia, nausea, vomiting, abdominal pain. BMs - twice a day - no blood or melena. Denies change in appetite and unexplained weight loss. [facility] recently for PNA for 5 days. Reviewed labs in [facility] portal -- mild KAMI on her last day of admission.  LFTs wnl. Still taking Xarelto.

## 2025-07-01 ENCOUNTER — TELEPHONE ENCOUNTER (OUTPATIENT)
Dept: URBAN - METROPOLITAN AREA CLINIC 96 | Facility: CLINIC | Age: 80
End: 2025-07-01

## 2025-07-02 ENCOUNTER — TELEPHONE ENCOUNTER (OUTPATIENT)
Dept: URBAN - METROPOLITAN AREA CLINIC 96 | Facility: CLINIC | Age: 80
End: 2025-07-02

## 2025-07-02 ENCOUNTER — OFFICE VISIT (OUTPATIENT)
Dept: URBAN - METROPOLITAN AREA CLINIC 96 | Facility: CLINIC | Age: 80
End: 2025-07-02

## 2025-07-02 RX ORDER — CARVEDILOL 6.25 MG/1
1 TABLET WITH FOOD TABLET, FILM COATED ORAL TWICE A DAY
COMMUNITY

## 2025-07-02 RX ORDER — FUROSEMIDE 40 MG/1
1 TABLET TABLET ORAL ONCE A DAY
Refills: 0 | COMMUNITY
Start: 1900-01-01

## 2025-07-02 RX ORDER — ATORVASTATIN CALCIUM 80 MG/1
1 TABLET TABLET, FILM COATED ORAL ONCE A DAY
COMMUNITY

## 2025-07-02 RX ORDER — RIVAROXABAN 10 MG/1
1 TABLET TABLET, FILM COATED ORAL ONCE A DAY
Refills: 0 | COMMUNITY
Start: 1900-01-01

## 2025-07-02 RX ORDER — FERROUS SULFATE 325(65) MG
1 TABLET TABLET ORAL THREE TIMES A DAY
COMMUNITY

## 2025-07-02 RX ORDER — METFORMIN HYDROCHLORIDE 500 MG/1
1 TABLET WITH A MEAL TABLET, COATED ORAL TWICE A DAY
Refills: 0 | COMMUNITY
Start: 1900-01-01

## 2025-07-02 RX ORDER — GABAPENTIN 300 MG/1
CAPSULE ORAL
Qty: 0 | Refills: 0 | COMMUNITY
Start: 1900-01-01

## 2025-07-02 RX ORDER — SODIUM BICARBONATE TAB 650 MG 650 MG
AS DIRECTED TAB ORAL DAILY
COMMUNITY

## 2025-07-02 NOTE — HPI-OTHER HISTORIES
Previously 1/2025 with Dr. Duncan: Aadmit 8/7-12/2024 for hematochezia. colon- osis.Heme felt a dec dose of xarelto of 10mg a compromise w hx of GIB x2 and recurrent PE. Had repeat diverticular bleed 11/13/2024, CT angio 11/13/2024-positive for bleeding sigmoid. IR determined not appropriate for embolization. FS 11/13/2024-full of blood. Colon 11/14/2024-full of blood, diverticulosis. Restarted on Xarelto.  (Cody is cardiologist, Yunier is oncologist)

## 2025-07-02 NOTE — HPI-TODAY'S VISIT:
80 year old female presents for follow-up. Seen 6/5/2025 by PA. Established patient of Dr. Duncan.   As noted prior, was inpatient at [facility] for PNA for 5 days. Mild KAMI on her last day of admission. Still taking Xarelto.

## 2025-07-09 ENCOUNTER — OFFICE VISIT (OUTPATIENT)
Dept: URBAN - METROPOLITAN AREA TELEHEALTH 2 | Facility: TELEHEALTH | Age: 80
End: 2025-07-09

## 2025-07-09 RX ORDER — SODIUM BICARBONATE TAB 650 MG 650 MG
AS DIRECTED TAB ORAL DAILY
COMMUNITY

## 2025-07-09 RX ORDER — GABAPENTIN 300 MG/1
CAPSULE ORAL
Qty: 0 | Refills: 0 | COMMUNITY
Start: 1900-01-01

## 2025-07-09 RX ORDER — ATORVASTATIN CALCIUM 80 MG/1
1 TABLET TABLET, FILM COATED ORAL ONCE A DAY
COMMUNITY

## 2025-07-09 RX ORDER — RIVAROXABAN 10 MG/1
1 TABLET TABLET, FILM COATED ORAL ONCE A DAY
Refills: 0 | COMMUNITY
Start: 1900-01-01

## 2025-07-09 RX ORDER — CARVEDILOL 6.25 MG/1
1 TABLET WITH FOOD TABLET, FILM COATED ORAL TWICE A DAY
COMMUNITY

## 2025-07-09 RX ORDER — FUROSEMIDE 40 MG/1
1 TABLET TABLET ORAL ONCE A DAY
Refills: 0 | COMMUNITY
Start: 1900-01-01

## 2025-07-09 RX ORDER — FERROUS SULFATE 325(65) MG
1 TABLET TABLET ORAL THREE TIMES A DAY
COMMUNITY

## 2025-07-09 RX ORDER — METFORMIN HYDROCHLORIDE 500 MG/1
1 TABLET WITH A MEAL TABLET, COATED ORAL TWICE A DAY
Refills: 0 | COMMUNITY
Start: 1900-01-01